# Patient Record
Sex: FEMALE | Race: WHITE | NOT HISPANIC OR LATINO
[De-identification: names, ages, dates, MRNs, and addresses within clinical notes are randomized per-mention and may not be internally consistent; named-entity substitution may affect disease eponyms.]

---

## 2018-08-22 ENCOUNTER — TRANSCRIPTION ENCOUNTER (OUTPATIENT)
Age: 23
End: 2018-08-22

## 2018-08-24 ENCOUNTER — EMERGENCY (EMERGENCY)
Facility: HOSPITAL | Age: 23
LOS: 1 days | Discharge: HOME | End: 2018-08-24
Attending: EMERGENCY MEDICINE
Payer: COMMERCIAL

## 2018-08-24 VITALS
HEART RATE: 75 BPM | WEIGHT: 154.1 LBS | OXYGEN SATURATION: 100 % | DIASTOLIC BLOOD PRESSURE: 77 MMHG | TEMPERATURE: 99 F | HEIGHT: 65 IN | SYSTOLIC BLOOD PRESSURE: 114 MMHG | RESPIRATION RATE: 16 BRPM

## 2018-08-24 VITALS
RESPIRATION RATE: 18 BRPM | TEMPERATURE: 100 F | DIASTOLIC BLOOD PRESSURE: 72 MMHG | OXYGEN SATURATION: 100 % | SYSTOLIC BLOOD PRESSURE: 99 MMHG | HEART RATE: 82 BPM

## 2018-08-24 LAB
ALBUMIN SERPL ELPH-MCNC: 4.7 G/DL — SIGNIFICANT CHANGE UP (ref 3.3–5)
ALP SERPL-CCNC: 64 U/L — SIGNIFICANT CHANGE UP (ref 40–120)
ALT FLD-CCNC: 15 U/L — SIGNIFICANT CHANGE UP (ref 10–45)
ANION GAP SERPL CALC-SCNC: 12 MMOL/L — SIGNIFICANT CHANGE UP (ref 5–17)
AST SERPL-CCNC: 16 U/L — SIGNIFICANT CHANGE UP (ref 10–40)
BASOPHILS # BLD AUTO: 0 K/UL — SIGNIFICANT CHANGE UP (ref 0–0.2)
BASOPHILS NFR BLD AUTO: 0.9 % — SIGNIFICANT CHANGE UP (ref 0–2)
BILIRUB SERPL-MCNC: 0.2 MG/DL — SIGNIFICANT CHANGE UP (ref 0.2–1.2)
BUN SERPL-MCNC: 6 MG/DL — LOW (ref 7–23)
CALCIUM SERPL-MCNC: 9.5 MG/DL — SIGNIFICANT CHANGE UP (ref 8.4–10.5)
CHLORIDE SERPL-SCNC: 101 MMOL/L — SIGNIFICANT CHANGE UP (ref 96–108)
CO2 SERPL-SCNC: 24 MMOL/L — SIGNIFICANT CHANGE UP (ref 22–31)
CREAT SERPL-MCNC: 0.75 MG/DL — SIGNIFICANT CHANGE UP (ref 0.5–1.3)
EOSINOPHIL # BLD AUTO: 0 K/UL — SIGNIFICANT CHANGE UP (ref 0–0.5)
EOSINOPHIL NFR BLD AUTO: 0.6 % — SIGNIFICANT CHANGE UP (ref 0–6)
GLUCOSE SERPL-MCNC: 98 MG/DL — SIGNIFICANT CHANGE UP (ref 70–99)
HCT VFR BLD CALC: 43 % — SIGNIFICANT CHANGE UP (ref 34.5–45)
HGB BLD-MCNC: 14.4 G/DL — SIGNIFICANT CHANGE UP (ref 11.5–15.5)
LYMPHOCYTES # BLD AUTO: 1 K/UL — SIGNIFICANT CHANGE UP (ref 1–3.3)
LYMPHOCYTES # BLD AUTO: 21.1 % — SIGNIFICANT CHANGE UP (ref 13–44)
MCHC RBC-ENTMCNC: 29.7 PG — SIGNIFICANT CHANGE UP (ref 27–34)
MCHC RBC-ENTMCNC: 33.4 GM/DL — SIGNIFICANT CHANGE UP (ref 32–36)
MCV RBC AUTO: 88.9 FL — SIGNIFICANT CHANGE UP (ref 80–100)
MONOCYTES # BLD AUTO: 0.4 K/UL — SIGNIFICANT CHANGE UP (ref 0–0.9)
MONOCYTES NFR BLD AUTO: 8.5 % — SIGNIFICANT CHANGE UP (ref 2–14)
NEUTROPHILS # BLD AUTO: 3.4 K/UL — SIGNIFICANT CHANGE UP (ref 1.8–7.4)
NEUTROPHILS NFR BLD AUTO: 68.9 % — SIGNIFICANT CHANGE UP (ref 43–77)
PLATELET # BLD AUTO: 305 K/UL — SIGNIFICANT CHANGE UP (ref 150–400)
POTASSIUM SERPL-MCNC: 3.8 MMOL/L — SIGNIFICANT CHANGE UP (ref 3.5–5.3)
POTASSIUM SERPL-SCNC: 3.8 MMOL/L — SIGNIFICANT CHANGE UP (ref 3.5–5.3)
PROT SERPL-MCNC: 7.9 G/DL — SIGNIFICANT CHANGE UP (ref 6–8.3)
RBC # BLD: 4.84 M/UL — SIGNIFICANT CHANGE UP (ref 3.8–5.2)
RBC # FLD: 10.9 % — SIGNIFICANT CHANGE UP (ref 10.3–14.5)
SODIUM SERPL-SCNC: 137 MMOL/L — SIGNIFICANT CHANGE UP (ref 135–145)
WBC # BLD: 4.9 K/UL — SIGNIFICANT CHANGE UP (ref 3.8–10.5)
WBC # FLD AUTO: 4.9 K/UL — SIGNIFICANT CHANGE UP (ref 3.8–10.5)

## 2018-08-24 PROCEDURE — 99284 EMERGENCY DEPT VISIT MOD MDM: CPT

## 2018-08-24 PROCEDURE — 85027 COMPLETE CBC AUTOMATED: CPT

## 2018-08-24 PROCEDURE — 80053 COMPREHEN METABOLIC PANEL: CPT

## 2018-08-24 PROCEDURE — 99284 EMERGENCY DEPT VISIT MOD MDM: CPT | Mod: 25

## 2018-08-24 PROCEDURE — 96361 HYDRATE IV INFUSION ADD-ON: CPT

## 2018-08-24 PROCEDURE — 96374 THER/PROPH/DIAG INJ IV PUSH: CPT

## 2018-08-24 RX ORDER — SODIUM CHLORIDE 9 MG/ML
1000 INJECTION INTRAMUSCULAR; INTRAVENOUS; SUBCUTANEOUS ONCE
Qty: 0 | Refills: 0 | Status: COMPLETED | OUTPATIENT
Start: 2018-08-24 | End: 2018-08-24

## 2018-08-24 RX ORDER — ACETAMINOPHEN 500 MG
1000 TABLET ORAL ONCE
Qty: 0 | Refills: 0 | Status: DISCONTINUED | OUTPATIENT
Start: 2018-08-24 | End: 2018-08-24

## 2018-08-24 RX ORDER — KETOROLAC TROMETHAMINE 30 MG/ML
15 SYRINGE (ML) INJECTION ONCE
Qty: 0 | Refills: 0 | Status: DISCONTINUED | OUTPATIENT
Start: 2018-08-24 | End: 2018-08-24

## 2018-08-24 RX ADMIN — Medication 15 MILLIGRAM(S): at 10:59

## 2018-08-24 RX ADMIN — SODIUM CHLORIDE 1000 MILLILITER(S): 9 INJECTION INTRAMUSCULAR; INTRAVENOUS; SUBCUTANEOUS at 11:20

## 2018-08-24 RX ADMIN — Medication 15 MILLIGRAM(S): at 11:10

## 2018-08-24 RX ADMIN — SODIUM CHLORIDE 2000 MILLILITER(S): 9 INJECTION INTRAMUSCULAR; INTRAVENOUS; SUBCUTANEOUS at 10:18

## 2018-08-24 NOTE — ED PROVIDER NOTE - OBJECTIVE STATEMENT
Brandon:  pt c/o headache and facial rash after her child had "roseola"  pt had chilld no fever malaise and headache.  no neck pain, Resident: Pt is otherwise healthy,  presenting with 5 days of headache, occasional flashing lights, some photophobia, nausea and a rash on her face upper chest and back. her daughter was recently sick with b/l red cheek rash and fever and was told she had 'roseola'. Pt denies nuchal rigidity, says headache isnt worst ache of her life, its achy comes and goes, temporarily resolves with tylenol.  She is immunized and has no family history of cerebral aneurysms. Denies visual changes, focal weakness or numbness.     Taylor:  pt c/o headache and facial rash after her child had "roseola"  pt had chilld no fever malaise and headache.  no neck pain,

## 2018-08-24 NOTE — ED PROVIDER NOTE - CHIEF COMPLAINT
The patient is a 23y Female complaining of The patient is a 23y Female complaining of headache for the past 5 days

## 2018-08-24 NOTE — ED PROVIDER NOTE - MEDICAL DECISION MAKING DETAILS
Pt is 23, otherwise healthy LMP last week, presenting with achy on and off headaches, photophobia, rash on cheeks upper back and chest, no nuchal rigidity, headache not thunderclap or worst of her life. On exam, pt has no neuro deficits, has an erythematous rash on b/l cheeks, upper back and chest. Sick contact daughter with fever and face rash, no recent travels. Will give fluids, toradol and re-assess. More likely to be viral complication, less likely to be meningitis, but will get rectal temp and basic labs. Low likelihood of bleed given nature of headache and no neuro deficits. pt stable, will re-assess Pt is 23, otherwise healthy LMP last week, presenting with achy on and off headaches, photophobia, rash on cheeks upper back and chest, no nuchal rigidity, headache not thunderclap or worst of her life. On exam, pt has no neuro deficits, has an erythematous rash on b/l cheeks, upper back and chest. Sick contact daughter with fever and face rash, no recent travels. Will give fluids, toradol and re-assess. More likely to be viral complication, less likely to be meningitis normal white count, but will get rectal temp. Low likelihood of bleed given nature of headache and no neuro deficits. pt stable, will re-assess Pt is 23, otherwise healthy LMP last week, presenting with achy on and off headaches, photophobia, rash on cheeks upper back and chest, no nuchal rigidity, headache not thunderclap or worst of her life. On exam, pt has no neuro deficits, has an erythematous rash on b/l cheeks, upper back and chest. Sick contact daughter with fever and face rash, no recent travels. Will give fluids, toradol and re-assess. More likely to be viral complication, less likely to be meningitis normal white count, but will get rectal temp. Low likelihood of bleed given nature of headache and no neuro deficits. pt stable, and on res-assessment says pain is much better. Told to take plenty of fluids and use tylenol/motrin at home as per instructions for pain control. Will discharge with strict return precautions.

## 2018-08-24 NOTE — ED PROVIDER NOTE - PHYSICAL EXAMINATION
Taylor:  General: No distress.  Mentation at baseline.   HEENT: WNL  Chest/Lungs: CTAB, No wheeze, No retractions, No increased work of breathing, Normal rate  Heart: S1S2 RRR, No M/R/G, Pules equal Bilaterally in upper and lower extremities distally  Abd: soft, NT/ND, No guarding, No rebound.  No hernias, no palpable masses.  Extrem: FROM in all joints, no significant edema noted, No ulcers.  Cap refil < 2sec.  Skin: rash on face and torso (chest and back)  non petechial .  Neuro:  Grossly normal.  No difficulty ambulating. No focal deficits.  Psychiatric: No evidence of delusions. No SI/HI.

## 2018-08-24 NOTE — ED PROVIDER NOTE - ATTENDING CONTRIBUTION TO CARE
Brandon:  I have independently evaluated the patient and have documented in the appropriate sections above.  I agree with the exam and plan as noted above.

## 2018-08-24 NOTE — ED ADULT NURSE REASSESSMENT NOTE - NS ED NURSE REASSESS COMMENT FT1
pt ambulatory and well appearing in ed at this time. states she is feeling relief from her headache with fluid and Toradol. will obtain rectal temp at this time.

## 2018-08-24 NOTE — ED PROVIDER NOTE - NS ED ROS FT
REVIEW OF SYSTEMS:    CONSTITUTIONAL: No weakness, fevers or chills  EYES/ENT: No visual changes;  No vertigo or throat pain   NECK: No pain or stiffness  RESPIRATORY: No cough, wheezing, hemoptysis; No shortness of breath  CARDIOVASCULAR: No chest pain or palpitations  GASTROINTESTINAL: No abdominal or epigastric pain. No nausea, vomiting, or hematemesis; No diarrhea or constipation. No melena or hematochezia.  GENITOURINARY: No dysuria, frequency or hematuria  NEUROLOGICAL: No numbness or weakness  SKIN: No itching. New erythematous rash on cheeks b/l, upper chest and back  MSK: No joint pain or  muscle aches

## 2018-08-24 NOTE — ED ADULT NURSE NOTE - OBJECTIVE STATEMENT
22 yo female comes to ED c/o headache, erythema on the cheeks and fever since Monday. Pt states her daughter has "delia" and fever recently. She denies international travel. Pt is endorsing nausea,  headache and flashing lights/ pain in the eyes not relieved by tylenols, Motrin or Excedrin. She denies chest pain, sob, v/d, blood in urine, blood in stool, syncope, sweating, numbness, tingling, weakness, neck pain/ stiffness. She is A&O x3 and well appearing now. VSS/ NAD. Safety and comfort maintained. Will continue to monitor.

## 2018-08-24 NOTE — ED ADULT NURSE NOTE - NSIMPLEMENTINTERV_GEN_ALL_ED
Implemented All Universal Safety Interventions:  [place] to call system. Call bell, personal items and telephone within reach. Instruct patient to call for assistance. Room bathroom lighting operational. Non-slip footwear when patient is off stretcher. Physically safe environment: no spills, clutter or unnecessary equipment. Stretcher in lowest position, wheels locked, appropriate side rails in place.

## 2023-10-23 ENCOUNTER — NON-APPOINTMENT (OUTPATIENT)
Age: 28
End: 2023-10-23

## 2023-11-20 ENCOUNTER — ASOB RESULT (OUTPATIENT)
Age: 28
End: 2023-11-20

## 2023-11-20 ENCOUNTER — APPOINTMENT (OUTPATIENT)
Dept: ANTEPARTUM | Facility: CLINIC | Age: 28
End: 2023-11-20
Payer: COMMERCIAL

## 2023-11-20 PROCEDURE — 76801 OB US < 14 WKS SINGLE FETUS: CPT

## 2023-11-20 PROCEDURE — 76813 OB US NUCHAL MEAS 1 GEST: CPT | Mod: 59

## 2024-01-16 ENCOUNTER — APPOINTMENT (OUTPATIENT)
Dept: ANTEPARTUM | Facility: CLINIC | Age: 29
End: 2024-01-16
Payer: COMMERCIAL

## 2024-01-16 ENCOUNTER — ASOB RESULT (OUTPATIENT)
Age: 29
End: 2024-01-16

## 2024-01-16 PROCEDURE — 76811 OB US DETAILED SNGL FETUS: CPT

## 2024-01-23 ENCOUNTER — APPOINTMENT (OUTPATIENT)
Dept: ANTEPARTUM | Facility: CLINIC | Age: 29
End: 2024-01-23
Payer: COMMERCIAL

## 2024-01-23 ENCOUNTER — ASOB RESULT (OUTPATIENT)
Age: 29
End: 2024-01-23

## 2024-01-23 PROCEDURE — 76816 OB US FOLLOW-UP PER FETUS: CPT

## 2024-03-05 ENCOUNTER — APPOINTMENT (OUTPATIENT)
Dept: ANTEPARTUM | Facility: CLINIC | Age: 29
End: 2024-03-05